# Patient Record
Sex: MALE | Race: BLACK OR AFRICAN AMERICAN | NOT HISPANIC OR LATINO
[De-identification: names, ages, dates, MRNs, and addresses within clinical notes are randomized per-mention and may not be internally consistent; named-entity substitution may affect disease eponyms.]

---

## 2021-02-08 PROBLEM — Z00.00 ENCOUNTER FOR PREVENTIVE HEALTH EXAMINATION: Status: ACTIVE | Noted: 2021-02-08

## 2021-02-09 ENCOUNTER — APPOINTMENT (OUTPATIENT)
Dept: OTOLARYNGOLOGY | Facility: CLINIC | Age: 36
End: 2021-02-09
Payer: MEDICAID

## 2021-02-09 VITALS
TEMPERATURE: 98.5 F | HEART RATE: 92 BPM | SYSTOLIC BLOOD PRESSURE: 129 MMHG | DIASTOLIC BLOOD PRESSURE: 97 MMHG | WEIGHT: 168 LBS | BODY MASS INDEX: 20.46 KG/M2 | HEIGHT: 76 IN

## 2021-02-09 DIAGNOSIS — Z78.9 OTHER SPECIFIED HEALTH STATUS: ICD-10-CM

## 2021-02-09 DIAGNOSIS — Z86.79 PERSONAL HISTORY OF OTHER DISEASES OF THE CIRCULATORY SYSTEM: ICD-10-CM

## 2021-02-09 DIAGNOSIS — Z82.49 FAMILY HISTORY OF ISCHEMIC HEART DISEASE AND OTHER DISEASES OF THE CIRCULATORY SYSTEM: ICD-10-CM

## 2021-02-09 PROCEDURE — 31231 NASAL ENDOSCOPY DX: CPT

## 2021-02-09 PROCEDURE — 99204 OFFICE O/P NEW MOD 45 MIN: CPT | Mod: 25

## 2021-02-09 PROCEDURE — 99072 ADDL SUPL MATRL&STAF TM PHE: CPT

## 2021-02-10 PROBLEM — Z86.79 HISTORY OF HYPERTENSION: Status: RESOLVED | Noted: 2021-02-09 | Resolved: 2021-02-10

## 2021-02-10 PROBLEM — Z78.9 NON-SMOKER: Status: ACTIVE | Noted: 2021-02-09

## 2021-02-10 PROBLEM — Z82.49 FAMILY HISTORY OF HYPERTENSION: Status: ACTIVE | Noted: 2021-02-09

## 2021-02-10 NOTE — CONSULT LETTER
[Dear  ___] : Dear  [unfilled], [Courtesy Letter:] : I had the pleasure of seeing your patient, [unfilled], in my office today. [Consult Closing:] : Thank you very much for allowing me to participate in the care of this patient.  If you have any questions, please do not hesitate to contact me. [Sincerely,] : Sincerely, [FreeTextEntry3] : Vin Allen MD\par Department of Otolaryngology - Head and Neck Surgery\par [facility] [___] : [unfilled]

## 2021-02-10 NOTE — ASSESSMENT
D/C home with written and verbal instructions re: Rx, activity, f/u.  Verbalizes understanding.  Ambulated out with steady gait with family member. Educated on antibiotic use.      [FreeTextEntry1] : 35M here for initial evaluation. For years, he c/o nasal obstruction/congestion. The left side is always worse than the right and the degree of obstruction varies and alternates between the two. This gives decreased sense of smell and fatigue. There is no green drainage, no foul nasal odor and no h/o sinusitis. Nasal steroid sprays have not helped. Allergy testing is negative.\par CT sinus shows leftward septal deviation, inferior turbinate hypertrophy and scattered ethmoid mucosal disease.\par On exam, nasal endoscopy shows leftward septal deviation and turbinate hypertrophy; there is marked improvement in nasal breathing after topical decongestion.\par His nasal obstruction/congestion is due to septal deviation and turbinate hypertrophy. Left sided breathing worse due to septal deviation. This was discussed. I would recommend septoplasty/turbinate hypertrophy to improve his nasal breathing. This was discussed at length and all questions answered. Plan for OR in the next 6 weeks or so.

## 2021-02-10 NOTE — PROCEDURE
[FreeTextEntry3] : Nasal Endoscopy:\par leftward septal deviation\par turbinate hypertrophy --> decongest very well w marked improvement in nasal breathing\par no mucopus or polyps\par choana clear

## 2021-02-10 NOTE — HISTORY OF PRESENT ILLNESS
[de-identified] : 35M here for initial evaluation.\par \par For years, he c/o nasal obstruction/congestion. The left side is always worse than the right and the degree of obstruction varies and alternates between the two. This gives decreased sense of smell and fatigue. There is no green drainage, no foul nasal odor and no h/o sinusitis. Nasal steroid sprays have not helped.\par Allergy testing is negative.\par \par CT Sinus 1/4/21 (I reviewed images):\par -leftward septal deviation\par -turbinate hypertrophy\par -scattered ethmoid mucosal disease, otherwise paranasal sinuses widely patent\par \par ROS otherwise unremarkable.\par \par

## 2021-03-15 ENCOUNTER — LABORATORY RESULT (OUTPATIENT)
Age: 36
End: 2021-03-15

## 2021-03-16 ENCOUNTER — TRANSCRIPTION ENCOUNTER (OUTPATIENT)
Age: 36
End: 2021-03-16

## 2021-03-17 ENCOUNTER — OUTPATIENT (OUTPATIENT)
Dept: OUTPATIENT SERVICES | Facility: HOSPITAL | Age: 36
LOS: 1 days | Discharge: HOME | End: 2021-03-17
Payer: MEDICAID

## 2021-03-17 ENCOUNTER — RESULT REVIEW (OUTPATIENT)
Age: 36
End: 2021-03-17

## 2021-03-17 ENCOUNTER — APPOINTMENT (OUTPATIENT)
Dept: OTOLARYNGOLOGY | Facility: HOSPITAL | Age: 36
End: 2021-03-17

## 2021-03-17 PROCEDURE — 30130 EXCISE INFERIOR TURBINATE: CPT | Mod: 50

## 2021-03-17 PROCEDURE — 88304 TISSUE EXAM BY PATHOLOGIST: CPT | Mod: 26

## 2021-03-17 PROCEDURE — 88300 SURGICAL PATH GROSS: CPT | Mod: 26,59

## 2021-03-17 PROCEDURE — 30520 REPAIR OF NASAL SEPTUM: CPT

## 2021-03-17 PROCEDURE — 88311 DECALCIFY TISSUE: CPT | Mod: 26

## 2021-03-18 RX ORDER — AZITHROMYCIN 250 MG/1
250 TABLET, FILM COATED ORAL
Qty: 1 | Refills: 0 | Status: ACTIVE | COMMUNITY
Start: 2021-03-17 | End: 1900-01-01

## 2021-03-18 RX ORDER — SODIUM CHLORIDE 0.65 %
0.65 AEROSOL, SPRAY (ML) NASAL
Qty: 2 | Refills: 5 | Status: ACTIVE | COMMUNITY
Start: 2021-03-17 | End: 1900-01-01

## 2021-03-18 RX ORDER — OXYCODONE AND ACETAMINOPHEN 5; 325 MG/1; MG/1
5-325 TABLET ORAL
Qty: 30 | Refills: 0 | Status: ACTIVE | COMMUNITY
Start: 2021-03-17 | End: 1900-01-01

## 2021-03-23 LAB — SURGICAL PATHOLOGY STUDY: SIGNIFICANT CHANGE UP

## 2021-03-24 ENCOUNTER — APPOINTMENT (OUTPATIENT)
Dept: OTOLARYNGOLOGY | Facility: CLINIC | Age: 36
End: 2021-03-24
Payer: MEDICAID

## 2021-03-24 VITALS
HEIGHT: 76 IN | SYSTOLIC BLOOD PRESSURE: 142 MMHG | HEART RATE: 82 BPM | BODY MASS INDEX: 20.34 KG/M2 | WEIGHT: 167 LBS | TEMPERATURE: 97.6 F | DIASTOLIC BLOOD PRESSURE: 97 MMHG

## 2021-03-24 DIAGNOSIS — J34.89 OTHER SPECIFIED DISORDERS OF NOSE AND NASAL SINUSES: ICD-10-CM

## 2021-03-24 DIAGNOSIS — J34.2 DEVIATED NASAL SEPTUM: ICD-10-CM

## 2021-03-24 PROCEDURE — 99024 POSTOP FOLLOW-UP VISIT: CPT

## 2021-03-24 PROCEDURE — 31237 NSL/SINS NDSC SURG BX POLYPC: CPT

## 2021-03-24 RX ORDER — SODIUM CHLORIDE 0.65 %
0.65 AEROSOL, SPRAY (ML) NASAL
Qty: 2 | Refills: 5 | Status: ACTIVE | COMMUNITY
Start: 2021-03-24 | End: 1900-01-01

## 2021-03-24 NOTE — PROCEDURE
[FreeTextEntry3] : doyles removed\par \par Nasal Endoscopy:\par coagulum and clot removed\par septum intact and midline\par turbinates reduced and lateralized\par nasal airways widely patent

## 2021-03-24 NOTE — CONSULT LETTER
[Dear  ___] : Dear  [unfilled], [Courtesy Letter:] : I had the pleasure of seeing your patient, [unfilled], in my office today. [Consult Closing:] : Thank you very much for allowing me to participate in the care of this patient.  If you have any questions, please do not hesitate to contact me. [Sincerely,] : Sincerely, [FreeTextEntry3] : Vin Allen MD\par Department of Otolaryngology - Head and Neck Surgery\par [facility] [___] : [unfilled]

## 2021-03-24 NOTE — HISTORY OF PRESENT ILLNESS
[de-identified] : 35M here in first postoperative visit s/p septoplasty/turbinate reduction 3/17/21 for nasal obstruction.\par \par He is doing ok since surgery. He is very congested and there is still some bloody drainage and pain over his nasal tip with mild facial pressure. He took the medication as prescribed.\par \par Pathology:\par .1. Nasal septum:\par - Nasal septal cartilage and bone without significant pathology on gross examination.\par 2. Bilateral turbinates:\par - Sinonasal tissue, with mild chronic inflammation.\par \par ROS otherwise unremarkable

## 2021-03-24 NOTE — ASSESSMENT
[FreeTextEntry1] : 35M here in first postoperative visit s/p septoplasty/turbinate reduction 3/17/21 for nasal obstruction. He is doing ok since surgery. He is very congested and there is still some bloody drainage and pain over his nasal tip. He took the medication as prescribed. On exam, after debridement, nasal endoscopy shows widely patent nasal airways with an intact and midline septum and reduced turbinates.\par He is doing well. Advance activity. Saline throughout the day. RTO 4 weeks.\par

## 2021-04-29 ENCOUNTER — APPOINTMENT (OUTPATIENT)
Dept: OTOLARYNGOLOGY | Facility: CLINIC | Age: 36
End: 2021-04-29